# Patient Record
Sex: MALE | Race: BLACK OR AFRICAN AMERICAN | NOT HISPANIC OR LATINO | Employment: FULL TIME | URBAN - METROPOLITAN AREA
[De-identification: names, ages, dates, MRNs, and addresses within clinical notes are randomized per-mention and may not be internally consistent; named-entity substitution may affect disease eponyms.]

---

## 2017-02-20 ENCOUNTER — OFFICE VISIT (OUTPATIENT)
Dept: ORTHOPEDICS | Facility: CLINIC | Age: 32
End: 2017-02-20

## 2017-02-20 VITALS
WEIGHT: 160 LBS | HEIGHT: 69 IN | SYSTOLIC BLOOD PRESSURE: 116 MMHG | BODY MASS INDEX: 23.7 KG/M2 | DIASTOLIC BLOOD PRESSURE: 86 MMHG

## 2017-02-20 DIAGNOSIS — M25.572 LEFT ANKLE PAIN, UNSPECIFIED CHRONICITY: Primary | ICD-10-CM

## 2017-02-20 NOTE — PROGRESS NOTES
" Subjective:   Nathanael Peterson is a 31 year old male who is here for left ankle pain upon running and playing soccer. Hx of fibula fracture 6/2016.   He states that he had no ankle problems prior to his distal fibula fracture in 06/2016.  He was placed in a boot for 1 month and then was out of the boot and never did any rehabilitation.  In approximately December he started a very gradual return to soccer and now is trying to play soccer more regularly.  He notes that he has some episodes of lateral ankle discomfort, not described as catching or mechanical.  It is not particularly sharp pain but it can be sort of a burning type sensation that can occur and it only occurs when he stresses the ankle.  He does not have any pain with ADLs.  He denies any swelling.       Background:   Date of injury: 6/2016   Prior Evaluation: Prior Physician Evalutation: ED and X-rays    PAST MEDICAL, SOCIAL, SURGICAL AND FAMILY HISTORY: He  has no past medical history on file.  He  has no past surgical history on file.  His family history is not on file.  He reports that he has never smoked. He does not have any smokeless tobacco history on file. He reports that he drinks alcohol. He reports that he does not use illicit drugs.    ALLERGIES: He has No Known Allergies.    CURRENT MEDICATIONS: He currently has no medications in their medication list.     REVIEW OF SYSTEMS: 6 point review of systems is negative except as noted above.     Exam:   /86  Ht 5' 9\" (1.753 m)  Wt 160 lb (72.6 kg)  BMI 23.63 kg/m2      CONSTITUTIONIAL: healthy, alert and no distress  SKIN: no suspicious lesions or rashes  GAIT: normal  PSYCHIATRIC: affect normal/bright and mentation appears normal.  Left ankle:  There is no discrete bony tenderness over the lateral or medial malleolus, calcaneus, Achilles insertion, navicular, cuboid or medial malleolus or tarsal tibial articulation.  There is no joint effusion.  The Achilles is nontender and " non-nodular and intact.  He does have some mild discomfort along the course of the ATFL.  He is nontender over the CFL.  He has negative anterior drawer, negative talar tilt and negative dorsiflexion external rotation test.  He is nontender over the peroneal tendons.      Repeat ankle radiographs are obtained today and demonstrate no evidence of fracture.  In fact, his prior distal fibula fracture is completely healed.      ASSESSMENT AND PLAN:  Yves is a 31-year-old gentleman with some lateral ankle discomfort with high stress, such as when he is playing soccer.  He has some continued discomfort from his ankle sprain and distal fibula fracture.  I have advised him to see physical therapy for a proprioceptive strengthening program and adhere to this for a full 2 months.  If he continues to have discomfort at 2 months, he can return for reevaluation.  I also have reassured him that the fracture is well healed.  I have also discussed with him consideration for bracing to help with both primary and secondary prevention of ankle sprain and he will consider that.  However, he would only need to use this while he was playing soccer and that may be an issue in terms of having the brace on while in his soccer cleat.  He will consider this.

## 2017-02-20 NOTE — LETTER
"  2/20/2017      RE: Nathanael Peterson  [address] [facility] [address]        Subjective:   Nathanael Peterson is a 31 year old male who is here for left ankle pain upon running and playing soccer. Hx of fibula fracture 6/2016.   He states that he had no ankle problems prior to his distal fibula fracture in 06/2016.  He was placed in a boot for 1 month and then was out of the boot and never did any rehabilitation.  In approximately December he started a very gradual return to soccer and now is trying to play soccer more regularly.  He notes that he has some episodes of lateral ankle discomfort, not described as catching or mechanical.  It is not particularly sharp pain but it can be sort of a burning type sensation that can occur and it only occurs when he stresses the ankle.  He does not have any pain with ADLs.  He denies any swelling.       Background:   Date of injury: 6/2016   Prior Evaluation: Prior Physician Evalutation: ED and X-rays    PAST MEDICAL, SOCIAL, SURGICAL AND FAMILY HISTORY: He  has no past medical history on file.  He  has no past surgical history on file.  His family history is not on file.  He reports that he has never smoked. He does not have any smokeless tobacco history on file. He reports that he drinks alcohol. He reports that he does not use illicit drugs.    ALLERGIES: He has No Known Allergies.    CURRENT MEDICATIONS: He currently has no medications in their medication list.     REVIEW OF SYSTEMS: 6 point review of systems is negative except as noted above.     Exam:   /86  Ht 5' 9\" (1.753 m)  Wt 160 lb (72.6 kg)  BMI 23.63 kg/m2      CONSTITUTIONIAL: healthy, alert and no distress  SKIN: no suspicious lesions or rashes  GAIT: normal  PSYCHIATRIC: affect normal/bright and mentation appears normal.  Left ankle:  There is no discrete bony tenderness over the lateral or medial malleolus, calcaneus, Achilles insertion, navicular, cuboid or medial malleolus or " tarsal tibial articulation.  There is no joint effusion.  The Achilles is nontender and non-nodular and intact.  He does have some mild discomfort along the course of the ATFL.  He is nontender over the CFL.  He has negative anterior drawer, negative talar tilt and negative dorsiflexion external rotation test.  He is nontender over the peroneal tendons.      Repeat ankle radiographs are obtained today and demonstrate no evidence of fracture.  In fact, his prior distal fibula fracture is completely healed.      ASSESSMENT AND PLAN:  Yves is a 31-year-old gentleman with some lateral ankle discomfort with high stress, such as when he is playing soccer.  He has some continued discomfort from his ankle sprain and distal fibula fracture.  I have advised him to see physical therapy for a proprioceptive strengthening program and adhere to this for a full 2 months.  If he continues to have discomfort at 2 months, he can return for reevaluation.  I also have reassured him that the fracture is well healed.  I have also discussed with him consideration for bracing to help with both primary and secondary prevention of ankle sprain and he will consider that.  However, he would only need to use this while he was playing soccer and that may be an issue in terms of having the brace on while in his soccer cleat.  He will consider this.           Rene Rod MD

## 2017-02-20 NOTE — LETTER
Date:February 21, 2017      Patient was self referred, no letter generated. Do not send.        [facility] Health Information

## 2017-02-20 NOTE — MR AVS SNAPSHOT
After Visit Summary   2017    Nathanael Peterson    MRN: [id number]           Patient Information     Date Of Birth          1985        Visit Information        Provider Department      2017 11:30 AM Rene Rod MD [facility]        Today's Diagnoses     Left ankle pain, unspecified chronicity    -  1       Follow-ups after your visit        Additional Services     PHYSICAL THERAPY REFERRAL (Internal)       Physical Therapy Referral                  Your next 10 appointments already scheduled     Mar 10, 2017  4:40 PM CST   (Arrive by 4:25 PM)   MICHELLE Extremity with Henrietta Donovan PT   [facility] ([facility])    [address]  [address] [place] [address] [phone]              Who to contact     Please call your clinic at [phone] to:    Ask questions about your health    Make or cancel appointments    Discuss your medicines    Learn about your test results    Speak to your doctor   If you have compliments or concerns about an experience at your clinic, or if you wish to file a complaint, please contact [facility] Patient Relations at [phone] or email us at [email or web address]         Additional Information About Your Visit        MyChart Information     Fieldwirehart is an electronic gateway that provides easy, online access to your medical records. With Aiotra, you can request a clinic appointment, read your test results, renew a prescription or communicate with your care team.     To sign up for [employer] visit the website at [email or web address]   You will be asked to enter the access code listed below, as well as some personal information. Please follow the directions to create your username and password.     Your access code is: [email or web address]  Expires: 2017  3:12 PM     Your access code will  in 90 days. If you need help or a new  "code, please contact your [facility] or call [phone] for assistance.        Care EveryWhere ID     This is your Care EveryWhere ID. This could be used by other organizations to access your [place] medical records  [id number]        Your Vitals Were     Height BMI (Body Mass Index)                5' 9\" (1.753 m) 23.63 kg/m2           Blood Pressure from Last 3 Encounters:   02/20/17 116/86   06/28/16 115/69    Weight from Last 3 Encounters:   02/20/17 160 lb (72.6 kg)   06/28/16 165 lb (74.8 kg)              We Performed the Following     PHYSICAL THERAPY REFERRAL (Internal)          Today's Medication Changes          These changes are accurate as of: 2/20/17  3:12 PM.  If you have any questions, ask your nurse or doctor.               Stop taking these medicines if you haven't already. Please contact your care team if you have questions.     HYDROcodone-acetaminophen 5-325 MG per tablet   Commonly known as:  NORCO   Stopped by:  Rene Rod MD           ibuprofen 600 MG tablet   Commonly known as:  ADVIL/MOTRIN   Stopped by:  Rene Rod MD                    Primary Care Provider    None Doctor, MD       No address on file        Thank you!     Thank you for choosing [facility]  for your care. Our goal is always to provide you with excellent care. Hearing back from our patients is one way we can continue to improve our services. Please take a few minutes to complete the written survey that you may receive in the mail after your visit with us. Thank you!             Your Updated Medication List - Protect others around you: Learn how to safely use, store and throw away your medicines at www.disposemymeds.org.      Notice  As of 2/20/2017  3:12 PM    You have not been prescribed any medications.      "

## 2017-03-10 ENCOUNTER — THERAPY VISIT (OUTPATIENT)
Dept: PHYSICAL THERAPY | Facility: CLINIC | Age: 32
End: 2017-03-10
Payer: COMMERCIAL

## 2017-03-10 DIAGNOSIS — M25.572 PAIN IN JOINT INVOLVING ANKLE AND FOOT, LEFT: Primary | ICD-10-CM

## 2017-03-10 PROCEDURE — 97161 PT EVAL LOW COMPLEX 20 MIN: CPT | Mod: GP | Performed by: PHYSICAL THERAPIST

## 2017-03-10 PROCEDURE — 97110 THERAPEUTIC EXERCISES: CPT | Mod: GP | Performed by: PHYSICAL THERAPIST

## 2017-03-10 NOTE — PROGRESS NOTES
KEY PT FINDINGS:  1) Lacking dorsiflexion range of motion (both in OKC and CKC)  2) Lacking plantarflexion strength on the left  3) Impaired proprioception on the left    Physical Therapy Initial Evaluation: Subjective History     Injury/Condition Details:  Presenting Complaint Left ankle pain   Onset Timing/Date June 2016 - date of fracture,    Mechanism Patient was running and slid and heard his ankle fracture. He was in a boot for 1 month. He did not do any rehab after getting out of the boot. He had some difficulty after getting out of the boot but not enough to seek treatment. Has tried to start playing soccer again in November and began to feel some discomfort with the stressful activities.. He had most of his pain while playing vs. After or the next day. He currently plays soccer 1 x week with the goal to increase. Since November to now, the pain has gotten slightly better.      Symptom Behavior Details    Primary Symptoms Sporadic symptoms; Activity/position dependent, pain (Location: lateral ankle at the base of the fibula. Denies pain into the calf or the foot, Quality: Burning and Aching/Throbbing), catching/locking, weakness, denies numbness and tingling, denies swelling   Worst Pain 4/10 (with playing soccer)   Symptom Provocators Kicking is the worst   Best Pain 0/10    Symptom Relievers Most activities   Time of day dependent? Worse in evening after activity   Recent symptom change? symptoms improving slightly     Prior Testing/Intervention for current condition:  Prior Tests  x-ray - fracture healed   Prior Treatment Braced after the fracture      Lifestyle & General Medical History:  Employment [employer] at the [employer]    Usual physical activities  (within past year) Soccer multiple times per week, running for warm-up/pre-game   Orthopaedic history None   Notable medical history See Epic Chart     Subjective:    HPI                    Objective:    Standing Alignment:                Ankle/foot  deviations: Mild pes cavus bilaterally.     Gait:  Non-antalgic gait pattern, mild external rotation (too many toes) with ambulation        Flexibility/Screens:       Lower Extremity:  Decreased left lower extremity flexibility:Gastroc and Soleus    Decreased right lower extremity flexibility:  Gastroc and Soleus          Ankle/Foot Evaluation  ROM:    AROM:    Dorsiflexion:  Left:   KS: 12, KB: 17  Right:   KS: 14, KB: 20          Great Toe Extension: Left:  Limited     Right:  PROM:    Dorsiflexion:  Left:    CKC 10cm     Right:   CKC 13 cm                   LIGAMENT TESTING: not assessed              SPECIAL TESTS: not assessed    PALPATION:   Left ankle tenderness present at:  peroneals and lateral malleolus  Left ankle tenderness not present at:   achilles tendon; anterior tibialis; posterior tibialis; deltoid ligament or plantar fascia    Right ankle tenderness not present at:  posterior tibialis  EDEMA: normal          MOBILITY TESTING: Mobility testing ankle: Both feet are hypomobile in the midfoot region.               FUNCTIONAL TESTS:           Proprioception:  Stork Balance Test: % of Uninvolved: Impaired proprioception and decreased ankle strategy  on the left ankle. Does demonstrate stiffening through the hip with both legs.                                                   ANKLE LEFT (lbs) RIGHT (lbs)   Dorsiflexion 51 49   Plantarflexion 12 Toe raises 20 toe raises   Inversion 25 30   Eversion 34 28       As patient performed single leg toe raises, moved into a knee flexion pattern and had difficulty performing with knee straight. Will further assess at the next visit.      General     ROS    Assessment/Plan:      Patient is a 31 year old male with left side ankle complaints.    Patient has the following significant findings with corresponding treatment plan.                Diagnosis 1:  Lateral ankle pain  Pain -  hot/cold therapy, manual therapy, STS, splint/taping/bracing/orthotics, self  management and education  Decreased ROM/flexibility - manual therapy, therapeutic exercise and home program  Decreased joint mobility - manual therapy, therapeutic exercise and home program  Decreased strength - therapeutic exercise, therapeutic activities and home program  Impaired balance - neuro re-education, therapeutic activities and home program  Decreased proprioception - neuro re-education, therapeutic activities and home program  Decreased function - therapeutic activities and home program    Therapy Evaluation Codes:   1) History comprised of:   Personal factors that impact the plan of care:      Time since onset of symptoms.    Comorbidity factors that impact the plan of care are:      None.     Medications impacting care: None.  2) Examination of Body Systems comprised of:   Body structures and functions that impact the plan of care:      Ankle.   Activity limitations that impact the plan of care are:      Sports, Squatting/kneeling, Stairs and Walking.  3) Clinical presentation characteristics are:   Stable/Uncomplicated.  4) Decision-Making    Low complexity using standardized patient assessment instrument and/or measureable assessment of functional outcome.  Cumulative Therapy Evaluation is: Low complexity.    Previous and current functional limitations:  (See Goal Flow Sheet for this information)    Short term and Long term goals: (See Goal Flow Sheet for this information)     Communication ability:  Patient appears to be able to clearly communicate and understand verbal and written communication and follow directions correctly.  Treatment Explanation - The following has been discussed with the patient:   RX ordered/plan of care  Anticipated outcomes  Possible risks and side effects  This patient would benefit from PT intervention to resume normal activities.   Rehab potential is good.    Frequency:  2 X Month, once daily  Duration:  for 2 months  Discharge Plan:  Achieve all LTG.  Independent in  home treatment program.  Reach maximal therapeutic benefit.    Please refer to the daily flowsheet for treatment today, total treatment time and time spent performing 1:1 timed codes.

## 2017-03-10 NOTE — MR AVS SNAPSHOT
"              After Visit Summary   3/10/2017    Nathanael Peterson    MRN: [id number]           Patient Information     Date Of Birth          1985        Visit Information        Provider Department      3/10/2017 4:40 PM Henrietta Donovan, PT [facility]        Today's Diagnoses     Pain in joint involving ankle and foot, left    -  1       Follow-ups after your visit        Who to contact     If you have questions or need follow up information about today's clinic visit or your schedule please contact [facility] PHYSICAL THERAPY [facility] directly at [phone].  Normal or non-critical lab and imaging results will be communicated to you by FullStoryhart, letter or phone within 4 business days after the clinic has received the results. If you do not hear from us within 7 days, please contact the clinic through FullStoryhart or phone. If you have a critical or abnormal lab result, we will notify you by phone as soon as possible.  Submit refill requests through [employer] (TEN) or call your pharmacy and they will forward the refill request to us. Please allow 3 business days for your refill to be completed.          Additional Information About Your Visit        MyChart Information     [employer] ([employer]) lets you send messages to your doctor, view your test results, renew your prescriptions, schedule appointments and more. To sign up, go to [email or web address] ([email or web address]) . Click on \"Log in\" on the left side of the screen, which will take you to the Welcome page. Then click on \"Sign up Now\" on the right side of the page.     You will be asked to enter the access code listed below, as well as some personal information. Please follow the directions to create your username and password.     Your access code is: [email or web address]  Expires: 2017  3:12 PM     Your access code will  in 90 days. If you need help or a new code, please call your [place] clinic or [phone].        Care EveryWhere ID     This is your Care EveryWhere " ID. This could be used by other organizations to access your [place] medical records  [id number]         Blood Pressure from Last 3 Encounters:   02/20/17 116/86   06/28/16 115/69    Weight from Last 3 Encounters:   02/20/17 72.6 kg (160 lb)   06/28/16 74.8 kg (165 lb)              We Performed the Following     HC PT EVAL, LOW COMPLEXITY     MICHELLE INITIAL EVAL REPORT     THERAPEUTIC EXERCISES        Primary Care Provider    None Doctor, MD       No address on file        Thank you!     Thank you for choosing [facility] PHYSICAL THERAPY [facility]  for your care. Our goal is always to provide you with excellent care. Hearing back from our patients is one way we can continue to improve our services. Please take a few minutes to complete the written survey that you may receive in the mail after your visit with us. Thank you!             Your Updated Medication List - Protect others around you: Learn how to safely use, store and throw away your medicines at www.disposemymeds.org.      Notice  As of 3/10/2017  6:12 PM    You have not been prescribed any medications.

## 2018-09-17 ENCOUNTER — OFFICE VISIT (OUTPATIENT)
Dept: INTERNAL MEDICINE | Facility: CLINIC | Age: 33
End: 2018-09-17
Payer: COMMERCIAL

## 2018-09-17 VITALS
WEIGHT: 189.7 LBS | HEIGHT: 71 IN | HEART RATE: 59 BPM | RESPIRATION RATE: 20 BRPM | BODY MASS INDEX: 26.56 KG/M2 | DIASTOLIC BLOOD PRESSURE: 91 MMHG | SYSTOLIC BLOOD PRESSURE: 139 MMHG | OXYGEN SATURATION: 99 % | TEMPERATURE: 98 F

## 2018-09-17 DIAGNOSIS — Z13.6 CARDIOVASCULAR SCREENING; LDL GOAL LESS THAN 130: ICD-10-CM

## 2018-09-17 DIAGNOSIS — Z11.3 SCREEN FOR STD (SEXUALLY TRANSMITTED DISEASE): ICD-10-CM

## 2018-09-17 DIAGNOSIS — R03.0 ELEVATED BLOOD PRESSURE READING WITHOUT DIAGNOSIS OF HYPERTENSION: Primary | ICD-10-CM

## 2018-09-17 DIAGNOSIS — R63.5 WEIGHT GAIN: ICD-10-CM

## 2018-09-17 PROCEDURE — 87591 N.GONORRHOEAE DNA AMP PROB: CPT | Performed by: INTERNAL MEDICINE

## 2018-09-17 PROCEDURE — 99395 PREV VISIT EST AGE 18-39: CPT | Performed by: INTERNAL MEDICINE

## 2018-09-17 PROCEDURE — 87491 CHLMYD TRACH DNA AMP PROBE: CPT | Performed by: INTERNAL MEDICINE

## 2018-09-17 NOTE — MR AVS SNAPSHOT
After Visit Summary   9/17/2018    Nathanael Peterson    MRN: [id number]           Patient Information     Date Of Birth          1985        Visit Information        Provider Department      9/17/2018 3:00 PM Kirk Blas MD [facility]        Today's Diagnoses     Elevated blood pressure reading without diagnosis of hypertension    -  1    CARDIOVASCULAR SCREENING; LDL GOAL LESS THAN 130        Screen for STD (sexually transmitted disease)        Weight gain          Care Instructions      Preventive Health Recommendations  Male Ages 26 - 39    Yearly exam:             See your health care provider every year in order to  o   Review health changes.   o   Discuss preventive care.    o   Review your medicines if your doctor has prescribed any.    You should be tested each year for STDs (sexually transmitted diseases), if you re at risk.     After age 35, talk to your provider about cholesterol testing. If you are at risk for heart disease, have your cholesterol tested at least every 5 years.     If you are at risk for diabetes, you should have a diabetes test (fasting glucose).  Shots: Get a flu shot each year. Get a tetanus shot every 10 years.     Nutrition:    Eat at least 5 servings of fruits and vegetables daily.     Eat whole-grain bread, whole-wheat pasta and brown rice instead of white grains and rice.     Get adequate Calcium and Vitamin D.     Lifestyle    Exercise for at least 150 minutes a week (30 minutes a day, 5 days a week). This will help you control your weight and prevent disease.     Limit alcohol to one drink per day.     No smoking.     Wear sunscreen to prevent skin cancer.     See your dentist every six months for an exam and cleaning.             Follow-ups after your visit        Your next 10 appointments already scheduled     Sep 20, 2018  9:15 AM CDT   [facility]    [address]  [address]  [facility] [address]   [phone]           Please do not eat 10-12 hours before your appointment if you are coming in fasting for labs on lipids, cholesterol, or glucose (sugar). This does not apply to pregnant women. Water, hot tea and black coffee (with nothing added) are okay. Do not drink other fluids, diet soda or chew gum.              Future tests that were ordered for you today     Open Future Orders        Priority Expected Expires Ordered    TSH with free T4 reflex Routine  8/17/2019 9/17/2018    Treponema Abs w Reflex to RPR and Titer Routine  8/17/2019 9/17/2018    HIV Antigen Antibody Combo Routine  8/17/2019 9/17/2018    Lipid panel reflex to direct LDL Fasting Routine  9/17/2019 9/17/2018            Who to contact     If you have questions or need follow up information about today's clinic visit or your schedule please contact [facility] directly at [phone].  Normal or non-critical lab and imaging results will be communicated to you by MyChart, letter or phone within 4 business days after the clinic has received the results. If you do not hear from us within 7 days, please contact the clinic through [employer] or phone. If you have a critical or abnormal lab result, we will notify you by phone as soon as possible.  Submit refill requests through [employer] or call your pharmacy and they will forward the refill request to us. Please allow 3 business days for your refill to be completed.          Additional Information About Your Visit        Care EveryWhere ID     This is your Care EveryWhere ID. This could be used by other organizations to access your [facility] medical records  [id number]        Your Vitals Were     Pulse Temperature Respirations Height Pulse Oximetry BMI (Body Mass Index)    59 98  F (36.7  C) (Oral) 20 5' 10.75\" (1.797 m) 99% 26.65 kg/m2       Blood Pressure from Last 3 Encounters:   09/17/18 (!) 139/91   02/20/17 116/86   06/28/16 115/69    Weight " from Last 3 Encounters:   09/17/18 189 lb 11.2 oz (86 kg)   02/20/17 160 lb (72.6 kg)   06/28/16 165 lb (74.8 kg)              We Performed the Following     Chlamydia trachomatis PCR     Neisseria gonorrhoeae PCR        Primary Care Provider    None Specified       No primary provider on file.        Equal Access to Services     [facility] : Hadii diego farraro Sodaniela, waaxda luqadaha, qaybta kaaljason arrieta, genie jean . So [facility] [phone].    ATENCIÓN: Si habla español, tiene a sullivan disposición servicios gratuitos de asistencia lingüística. Llame al [phone].    We comply with applicable federal civil rights laws and [place] laws. We do not discriminate on the basis of race, color, national origin, age, disability, sex, sexual orientation, or gender identity.            Thank you!     Thank you for choosing [facility]  for your care. Our goal is always to provide you with excellent care. Hearing back from our patients is one way we can continue to improve our services. Please take a few minutes to complete the written survey that you may receive in the mail after your visit with us. Thank you!             Your Updated Medication List - Protect others around you: Learn how to safely use, store and throw away your medicines at www.disposemymeds.org.      Notice  As of 9/17/2018  4:59 PM    You have not been prescribed any medications.

## 2018-09-17 NOTE — LETTER
September 20, 2018      Nathanael Peterson  [id number]  [place] [address]        Dear ,    Your lab results are normal.    Resulted Orders   Chlamydia trachomatis PCR   Result Value Ref Range    Specimen Description Urine     Chlamydia Trachomatis PCR Negative NEG^Negative      Comment:      Negative for C. trachomatis rRNA by transcription mediated amplification.  A negative result by transcription mediated amplification does not preclude   the presence of C. trachomatis infection because results are dependent on   proper and adequate collection, absence of inhibitors, and sufficient rRNA to   be detected.     Neisseria gonorrhoeae PCR   Result Value Ref Range    Specimen Descrip Urine     N Gonorrhea PCR Negative NEG^Negative      Comment:      Negative for N. gonorrhoeae rRNA by transcription mediated amplification.  A negative result by transcription mediated amplification does not preclude   the presence of N. gonorrhoeae infection because results are dependent on   proper and adequate collection, absence of inhibitors, and sufficient rRNA to   be detected.         If you have any questions or concerns, please call the clinic at the number listed above.       Sincerely,        Kirk Blas MD

## 2018-09-17 NOTE — NURSING NOTE
"Vital signs:  Temp: 98  F (36.7  C) Temp src: Oral BP: (!) 139/91 Pulse: 59   Resp: 20 SpO2: 99 %     Height: 5' 10.75\" (179.7 cm) Weight: 189 lb 11.2 oz (86 kg)  Estimated body mass index is 26.65 kg/(m^2) as calculated from the following:    Height as of this encounter: 5' 10.75\" (1.797 m).    Weight as of this encounter: 189 lb 11.2 oz (86 kg).      "

## 2018-09-17 NOTE — PROGRESS NOTES
SUBJECTIVE:   CC: Nathanael Peterson is an 33 year old male who presents for preventative health visit.     Answers for HPI/ROS submitted by the patient on 9/17/2018   Annual Exam:  Getting at least 3 servings of Calcium per day:: Yes  Bi-annual eye exam:: NO  Dental care twice a year:: NO  Sleep apnea or symptoms of sleep apnea:: None  Diet:: Regular (no restrictions), Gluten-free/reduced  Frequency of exercise:: 1 day/week  Taking medications regularly:: Not Applicable  Additional concerns today:: No  PHQ-2 Score: 0  Duration of exercise:: Greater than 60 minutes       He wants to be tested for STD.    Weight increased from 160 to 190 lbs.      Today's PHQ-2 Score:   PHQ-2 ( 1999 [employer]) 9/17/2018   Q1: Little interest or pleasure in doing things 0   Q2: Feeling down, depressed or hopeless 0   PHQ-2 Score 0   Q1: Little interest or pleasure in doing things Not at all   Q2: Feeling down, depressed or hopeless Not at all   PHQ-2 Score 0       Abuse: Current or Past(Physical, Sexual or Emotional)- No  Do you feel safe in your environment - Yes    Social History   Substance Use Topics     Smoking status: Never Smoker     Smokeless tobacco: Never Used     Alcohol use Yes      Comment: socially      If you drink alcohol do you typically have >3 drinks per day or >7 drinks per week? Yes - AUDIT SCORE:     Social.                      Last PSA: No results found for: PSA    Reviewed orders with patient. Reviewed health maintenance and updated orders accordingly - Yes  Patient Active Problem List   Diagnosis     Pain in joint involving ankle and foot, left     History reviewed. No pertinent surgical history.    Social History   Substance Use Topics     Smoking status: Never Smoker     Smokeless tobacco: Never Used     Alcohol use Yes      Comment: socially     Family History   Problem Relation Age of Onset     No Known Problems Mother      No Known Problems Father            Reviewed and updated as needed this  "visit by clinical staff  Tobacco  Allergies  Meds  Med Hx  Surg Hx  Fam Hx  Soc Hx        Reviewed and updated as needed this visit by Provider          ROS:  CONSTITUTIONAL: NEGATIVE for fever, chills, change in weight  INTEGUMENTARY/SKIN: NEGATIVE for worrisome rashes, moles or lesions  EYES: NEGATIVE for vision changes or irritation  ENT: NEGATIVE for ear, mouth and throat problems  RESP: NEGATIVE for significant cough or SOB  CV: NEGATIVE for chest pain, palpitations or peripheral edema  GI: NEGATIVE for nausea, abdominal pain, heartburn, or change in bowel habits   male: negative for dysuria, hematuria, decreased urinary stream, erectile dysfunction, urethral discharge  MUSCULOSKELETAL: NEGATIVE for significant arthralgias or myalgia  NEURO: NEGATIVE for weakness, dizziness or paresthesias  PSYCHIATRIC: NEGATIVE for changes in mood or affect    OBJECTIVE:   BP (!) 139/91 (BP Location: Right arm, Patient Position: Sitting, Cuff Size: Adult Regular)  Pulse 59  Temp 98  F (36.7  C) (Oral)  Resp 20  Ht 5' 10.75\" (1.797 m)  Wt 189 lb 11.2 oz (86 kg)  SpO2 99%  BMI 26.65 kg/m2  EXAM:  GENERAL: healthy, alert and no distress  NECK: no adenopathy, no asymmetry, masses, or scars and thyroid normal to palpation  RESP: lungs clear to auscultation - no rales, rhonchi or wheezes  CV: regular rate and rhythm, normal S1 S2, no S3 or S4, no murmur, click or rub  ABDOMEN: soft, nontender, no hepatosplenomegaly, no masses and bowel sounds normal  MS: no gross musculoskeletal defects noted, no edema    Diagnostic Test Results:  none     ASSESSMENT/PLAN:   1. Elevated blood pressure reading without diagnosis of hypertension  Today, BP elevated to 139/91, on repeat 134/90.  He has no previous dx of htn  He has gained 30 lbs over the last year, he believes due to diet. Instructed him on importance of diet control, wt loss will help with BP. Will recheck in 6 months.      2. CARDIOVASCULAR SCREENING; LDL GOAL LESS THAN " "130  screen  - Lipid panel reflex to direct LDL Fasting; Future    3. Screen for STD (sexually transmitted disease)  screen  - Chlamydia trachomatis PCR  - Neisseria gonorrhoeae PCR  - HIV Antigen Antibody Combo  - Treponema Abs w Reflex to RPR and Titer    4. Weight gain  Will check thyroid function given weight gain  - TSH with free T4 reflex    COUNSELING:  Reviewed preventive health counseling, as reflected in patient instructions       Regular exercise       Healthy diet/nutrition    BP Readings from Last 1 Encounters:   09/17/18 (!) 139/91     Estimated body mass index is 26.65 kg/(m^2) as calculated from the following:    Height as of this encounter: 5' 10.75\" (1.797 m).    Weight as of this encounter: 189 lb 11.2 oz (86 kg).           reports that he has never smoked. He has never used smokeless tobacco.      Counseling Resources:  ATP IV Guidelines  Pooled Cohorts Equation Calculator  FRAX Risk Assessment  ICSI Preventive Guidelines  Dietary Guidelines for Americans, 2010  USDA's MyPlate  ASA Prophylaxis  Lung CA Screening    Kirk Blas MD  [facility]  "

## 2018-09-18 LAB
C TRACH DNA SPEC QL NAA+PROBE: NEGATIVE
N GONORRHOEA DNA SPEC QL NAA+PROBE: NEGATIVE
SPECIMEN SOURCE: NORMAL
SPECIMEN SOURCE: NORMAL

## 2018-09-20 DIAGNOSIS — Z13.6 CARDIOVASCULAR SCREENING; LDL GOAL LESS THAN 130: ICD-10-CM

## 2018-09-20 DIAGNOSIS — R63.5 WEIGHT GAIN: ICD-10-CM

## 2018-09-20 DIAGNOSIS — Z11.3 SCREEN FOR STD (SEXUALLY TRANSMITTED DISEASE): ICD-10-CM

## 2018-09-20 PROCEDURE — 84443 ASSAY THYROID STIM HORMONE: CPT | Performed by: INTERNAL MEDICINE

## 2018-09-20 PROCEDURE — 87389 HIV-1 AG W/HIV-1&-2 AB AG IA: CPT | Performed by: INTERNAL MEDICINE

## 2018-09-20 PROCEDURE — 80061 LIPID PANEL: CPT | Performed by: INTERNAL MEDICINE

## 2018-09-20 PROCEDURE — 86780 TREPONEMA PALLIDUM: CPT | Performed by: INTERNAL MEDICINE

## 2018-09-20 PROCEDURE — 36415 COLL VENOUS BLD VENIPUNCTURE: CPT | Performed by: INTERNAL MEDICINE

## 2018-09-21 LAB
CHOLEST SERPL-MCNC: 215 MG/DL
HDLC SERPL-MCNC: 53 MG/DL
HIV 1+2 AB+HIV1 P24 AG SERPL QL IA: NONREACTIVE
LDLC SERPL CALC-MCNC: 128 MG/DL
NONHDLC SERPL-MCNC: 162 MG/DL
T PALLIDUM AB SER QL: NONREACTIVE
TRIGL SERPL-MCNC: 170 MG/DL
TSH SERPL DL<=0.005 MIU/L-ACNC: 1.26 MU/L (ref 0.4–4)